# Patient Record
Sex: FEMALE | Race: WHITE | NOT HISPANIC OR LATINO | URBAN - METROPOLITAN AREA
[De-identification: names, ages, dates, MRNs, and addresses within clinical notes are randomized per-mention and may not be internally consistent; named-entity substitution may affect disease eponyms.]

---

## 2020-07-25 ENCOUNTER — TELEPHONE ENCOUNTER (OUTPATIENT)
Dept: URBAN - METROPOLITAN AREA CLINIC 13 | Facility: CLINIC | Age: 55
End: 2020-07-25

## 2020-07-25 RX ORDER — ESZOPICLONE 2 MG
TAKE 1 TABLET AT BEDTIME TABLET ORAL
Refills: 0 | OUTPATIENT
End: 2014-12-29

## 2020-07-25 RX ORDER — FUROSEMIDE 20 MG/1
TAKE 1 TABLET DAILY PRN TABLET ORAL
Refills: 0 | OUTPATIENT
End: 2014-12-29

## 2020-07-25 RX ORDER — INSULIN LISPRO 100 [IU]/ML
USE AS DIRECTED INJECTION, SOLUTION INTRAVENOUS; SUBCUTANEOUS
Refills: 0 | OUTPATIENT
Start: 2007-02-19 | End: 2008-09-03

## 2020-07-25 RX ORDER — CARISOPRODOL 250 MG/1
TAKE 1 TABLET 4 TIMES DAILY TABLET ORAL
Refills: 0 | OUTPATIENT
Start: 2008-09-03 | End: 2009-06-15

## 2020-07-25 RX ORDER — EZETIMIBE AND SIMVASTATIN 10; 80 MG/1; MG/1
TAKE 1 TABLET DAILY TABLET ORAL
Refills: 0 | OUTPATIENT
Start: 2005-08-18 | End: 2008-09-03

## 2020-07-25 RX ORDER — HUMAN INSULIN 100 [USP'U]/ML
USE AS DIRECTED INJECTION, SUSPENSION SUBCUTANEOUS
Refills: 0 | OUTPATIENT
Start: 2008-09-03 | End: 2009-06-15

## 2020-07-25 RX ORDER — INSULIN GLARGINE 100 [IU]/ML
INJECT SUBCUTANEOUSLY AS DIRECTED INJECTION, SOLUTION SUBCUTANEOUS
Refills: 0 | OUTPATIENT
Start: 2007-02-19 | End: 2008-09-03

## 2020-07-25 RX ORDER — OXYCODONE HYDROCHLORIDE AND ACETAMINOPHEN 10; 325 MG/1; MG/1
TAKE 1 TABLET EVERY 6 HOURS DAILY TABLET ORAL
Refills: 0 | OUTPATIENT
Start: 2009-07-20 | End: 2014-09-26

## 2020-07-25 RX ORDER — POLYETHYLENE GLYCOL 3350, SODIUM CHLORIDE, SODIUM BICARBONATE AND POTASSIUM CHLORIDE WITH LEMON FLAVOR 420; 11.2; 5.72; 1.48 G/4L; G/4L; G/4L; G/4L
TAKE 1/2 GALLON AT 5:00 PM DAY BEFORE PROCEDURE, TAKE SECOND 1/2 OF GALLON 6 HRS PRIOR TO PROCEDURE POWDER, FOR SOLUTION ORAL
Qty: 1 | Refills: 0 | OUTPATIENT
Start: 2014-09-26 | End: 2014-11-21

## 2020-07-26 ENCOUNTER — TELEPHONE ENCOUNTER (OUTPATIENT)
Dept: URBAN - METROPOLITAN AREA CLINIC 13 | Facility: CLINIC | Age: 55
End: 2020-07-26

## 2020-07-26 RX ORDER — INSULIN ASPART 100 [IU]/ML
INJECT SUBCUTANEOUSLY AS DIRECTED INJECTION, SOLUTION INTRAVENOUS; SUBCUTANEOUS
Refills: 0 | Status: ACTIVE | COMMUNITY
Start: 2009-06-15

## 2020-07-26 RX ORDER — DEXTROAMPHETAMINE SACCHARATE, AMPHETAMINE ASPARTATE, DEXTROAMPHETAMINE SULFATE AND AMPHETAMINE SULFATE 5; 5; 5; 5 MG/1; MG/1; MG/1; MG/1
TABLET ORAL
Qty: 120 | Refills: 0 | Status: ACTIVE | COMMUNITY
Start: 2013-06-03

## 2020-07-26 RX ORDER — OXYMORPHONE HYDROCHLORIDE 40 MG/1
TABLET, FILM COATED, EXTENDED RELEASE ORAL
Qty: 60 | Refills: 0 | Status: ACTIVE | COMMUNITY
Start: 2014-06-23

## 2020-07-26 RX ORDER — SUCRALFATE 1 G/10ML
SUSPENSION ORAL
Qty: 255 | Refills: 0 | Status: ACTIVE | COMMUNITY
Start: 2014-02-14

## 2020-07-26 RX ORDER — MONTELUKAST SODIUM 10 MG/1
TABLET, FILM COATED ORAL
Qty: 30 | Refills: 0 | Status: ACTIVE | COMMUNITY
Start: 2014-02-14

## 2020-07-26 RX ORDER — HYDROCODONE BITARTRATE AND ACETAMINOPHEN 5; 325 MG/1; MG/1
TABLET ORAL
Qty: 20 | Refills: 0 | Status: ACTIVE | COMMUNITY
Start: 2013-12-03

## 2020-07-26 RX ORDER — DEXTROAMPHETAMINE SACCHARATE, AMPHETAMINE ASPARTATE, DEXTROAMPHETAMINE SULFATE AND AMPHETAMINE SULFATE 5; 5; 5; 5 MG/1; MG/1; MG/1; MG/1
TABLET ORAL
Qty: 120 | Refills: 0 | Status: ACTIVE | COMMUNITY
Start: 2013-07-26

## 2020-07-26 RX ORDER — DEXTROAMPHETAMINE SACCHARATE, AMPHETAMINE ASPARTATE, DEXTROAMPHETAMINE SULFATE AND AMPHETAMINE SULFATE 5; 5; 5; 5 MG/1; MG/1; MG/1; MG/1
TABLET ORAL
Qty: 90 | Refills: 0 | Status: ACTIVE | COMMUNITY
Start: 2014-04-22

## 2020-07-26 RX ORDER — LINACLOTIDE 290 UG/1
CAPSULE, GELATIN COATED ORAL
Qty: 30 | Refills: 0 | Status: ACTIVE | COMMUNITY
Start: 2013-10-22

## 2020-07-26 RX ORDER — TRIAMTERENE AND HYDROCHLOROTHIAZIDE 37.5; 25 MG/1; MG/1
TABLET ORAL
Qty: 30 | Refills: 0 | Status: ACTIVE | COMMUNITY
Start: 2014-04-18

## 2020-07-26 RX ORDER — INSULIN GLARGINE 100 [IU]/ML
INJECTION, SOLUTION SUBCUTANEOUS
Qty: 15 | Refills: 0 | Status: ACTIVE | COMMUNITY
Start: 2014-02-04

## 2020-07-26 RX ORDER — AMOXICILLIN 500 MG/1
CAPSULE ORAL
Qty: 28 | Refills: 0 | Status: ACTIVE | COMMUNITY
Start: 2014-07-07

## 2020-07-26 RX ORDER — SUMATRIPTAN SUCCINATE 50 MG/1
TABLET, FILM COATED ORAL
Qty: 9 | Refills: 0 | Status: ACTIVE | COMMUNITY
Start: 2012-10-16

## 2020-07-26 RX ORDER — OXYMORPHONE HYDROCHLORIDE 40 MG/1
TABLET, FILM COATED, EXTENDED RELEASE ORAL
Qty: 60 | Refills: 0 | Status: ACTIVE | COMMUNITY
Start: 2014-03-31

## 2020-07-26 RX ORDER — DEXTROAMPHETAMINE SACCHARATE, AMPHETAMINE ASPARTATE, DEXTROAMPHETAMINE SULFATE, AND AMPHETAMINE SULFATE 5; 5; 5; 5 MG/1; MG/1; MG/1; MG/1
TAKE 2 TABLET TWICE DAILY TABLET ORAL
Refills: 0 | Status: ACTIVE | COMMUNITY

## 2020-07-26 RX ORDER — OXYMORPHONE HYDROCHLORIDE 40 MG/1
TABLET, FILM COATED, EXTENDED RELEASE ORAL
Qty: 60 | Refills: 0 | Status: ACTIVE | COMMUNITY
Start: 2013-12-10

## 2020-07-26 RX ORDER — FLUCONAZOLE 200 MG/1
TABLET ORAL
Qty: 2 | Refills: 0 | Status: ACTIVE | COMMUNITY
Start: 2013-04-30

## 2020-07-26 RX ORDER — SUCRALFATE 1 G/10ML
TAKE 10 ML 4 TIMES DAILY SUSPENSION ORAL
Refills: 0 | Status: ACTIVE | COMMUNITY

## 2020-07-26 RX ORDER — SUMATRIPTAN SUCCINATE 50 MG/1
TABLET, FILM COATED ORAL
Qty: 9 | Refills: 0 | Status: ACTIVE | COMMUNITY
Start: 2014-11-11

## 2020-07-26 RX ORDER — METRONIDAZOLE 250 MG/1
TABLET ORAL
Qty: 90 | Refills: 0 | Status: ACTIVE | COMMUNITY
Start: 2014-04-09

## 2020-07-26 RX ORDER — FLUCONAZOLE 200 MG/1
TABLET ORAL
Qty: 2 | Refills: 0 | Status: ACTIVE | COMMUNITY
Start: 2014-11-05

## 2020-07-26 RX ORDER — ZOLPIDEM TARTRATE 10 MG/1
TABLET, FILM COATED ORAL
Qty: 15 | Refills: 0 | Status: ACTIVE | COMMUNITY
Start: 2013-09-25

## 2020-07-26 RX ORDER — ALPRAZOLAM 2 MG/1
TABLET ORAL
Qty: 120 | Refills: 0 | Status: ACTIVE | COMMUNITY
Start: 2013-04-30

## 2020-07-26 RX ORDER — FLUCONAZOLE 150 MG/1
TAKE 1 TABLET DAILY AS DIRECTED TABLET ORAL
Refills: 0 | Status: ACTIVE | COMMUNITY

## 2020-07-26 RX ORDER — ALPRAZOLAM 2 MG/1
TAKE 1 TABLET 3 TIMES DAILY AS NEEDED TABLET ORAL
Refills: 0 | Status: ACTIVE | COMMUNITY

## 2020-07-26 RX ORDER — OXYMORPHONE HYDROCHLORIDE 40 MG/1
TABLET, FILM COATED, EXTENDED RELEASE ORAL
Qty: 60 | Refills: 0 | Status: ACTIVE | COMMUNITY
Start: 2013-06-03

## 2020-07-26 RX ORDER — LANCETS
EACH MISCELLANEOUS
Qty: 153 | Refills: 0 | Status: ACTIVE | COMMUNITY
Start: 2012-11-08

## 2020-07-26 RX ORDER — OXYCODONE AND ACETAMINOPHEN 5; 325 MG/1; MG/1
TABLET ORAL
Qty: 20 | Refills: 0 | Status: ACTIVE | COMMUNITY
Start: 2014-09-17

## 2020-07-26 RX ORDER — CIPROFLOXACIN HYDROCHLORIDE 500 MG/1
TABLET, FILM COATED ORAL
Qty: 14 | Refills: 0 | Status: ACTIVE | COMMUNITY
Start: 2014-04-01

## 2020-07-26 RX ORDER — INSULIN ASPART 100 [IU]/ML
INJECTION, SOLUTION INTRAVENOUS; SUBCUTANEOUS
Qty: 15 | Refills: 0 | Status: ACTIVE | COMMUNITY
Start: 2014-06-16

## 2020-07-26 RX ORDER — KETOROLAC TROMETHAMINE 15.75 MG/1
SPRAY, METERED NASAL
Qty: 5 | Refills: 0 | Status: ACTIVE | COMMUNITY
Start: 2013-07-31

## 2020-07-26 RX ORDER — DOXYCYCLINE HYCLATE 100 MG/1
TABLET ORAL
Qty: 20 | Refills: 0 | Status: ACTIVE | COMMUNITY
Start: 2013-12-27

## 2020-07-26 RX ORDER — CANAGLIFLOZIN 300 MG/1
TABLET, FILM COATED ORAL
Qty: 30 | Refills: 0 | Status: ACTIVE | COMMUNITY
Start: 2013-07-31

## 2020-07-26 RX ORDER — INSULIN GLARGINE 100 [IU]/ML
INJECTION, SOLUTION SUBCUTANEOUS
Qty: 15 | Refills: 0 | Status: ACTIVE | COMMUNITY
Start: 2013-04-30

## 2020-07-26 RX ORDER — ALPRAZOLAM 2 MG/1
TABLET ORAL
Qty: 90 | Refills: 0 | Status: ACTIVE | COMMUNITY
Start: 2014-05-22

## 2020-07-26 RX ORDER — CARISOPRODOL 350 MG/1
TABLET ORAL
Qty: 120 | Refills: 0 | Status: ACTIVE | COMMUNITY
Start: 2013-07-25

## 2020-07-26 RX ORDER — AMOXICILLIN 500 MG/1
CAPSULE ORAL
Qty: 28 | Refills: 0 | Status: ACTIVE | COMMUNITY
Start: 2014-08-20

## 2020-07-26 RX ORDER — DIAZEPAM 5 MG/1
TABLET ORAL
Qty: 1 | Refills: 0 | Status: ACTIVE | COMMUNITY
Start: 2013-09-06

## 2020-07-26 RX ORDER — SUCRALFATE 1 G/10ML
SUSPENSION ORAL
Qty: 255 | Refills: 0 | Status: ACTIVE | COMMUNITY
Start: 2013-08-18

## 2020-07-26 RX ORDER — BLOOD SUGAR DIAGNOSTIC
STRIP MISCELLANEOUS
Qty: 150 | Refills: 0 | Status: ACTIVE | COMMUNITY
Start: 2013-10-18

## 2020-07-26 RX ORDER — HYDROCODONE BITARTRATE AND HOMATROPINE METHYLBROMIDE 5; 1.5 MG/5ML; MG/5ML
SOLUTION ORAL
Qty: 120 | Refills: 0 | Status: ACTIVE | COMMUNITY
Start: 2013-12-27

## 2020-07-26 RX ORDER — MONTELUKAST SODIUM 10 MG/1
TAKE 1 TABLET DAILY TABLET, FILM COATED ORAL
Refills: 0 | Status: ACTIVE | COMMUNITY

## 2020-07-26 RX ORDER — OXYMORPHONE HYDROCHLORIDE 40 MG/1
TABLET, FILM COATED, EXTENDED RELEASE ORAL
Qty: 60 | Refills: 0 | Status: ACTIVE | COMMUNITY
Start: 2013-12-11

## 2020-07-26 RX ORDER — ONDANSETRON HYDROCHLORIDE 4 MG/1
TABLET, FILM COATED ORAL
Qty: 8 | Refills: 0 | Status: ACTIVE | COMMUNITY
Start: 2014-09-17

## 2020-07-26 RX ORDER — INSULIN GLARGINE 100 [IU]/ML
INJECT SUBCUTANEOUSLY AS DIRECTED INJECTION, SOLUTION SUBCUTANEOUS
Refills: 0 | Status: ACTIVE | COMMUNITY

## 2020-07-26 RX ORDER — GABAPENTIN 600 MG/1
TABLET, FILM COATED ORAL
Qty: 90 | Refills: 0 | Status: ACTIVE | COMMUNITY
Start: 2013-04-30

## 2020-07-26 RX ORDER — INSULIN ASPART 100 [IU]/ML
INJECTION, SOLUTION INTRAVENOUS; SUBCUTANEOUS
Qty: 15 | Refills: 0 | Status: ACTIVE | COMMUNITY
Start: 2013-04-30

## 2020-07-26 RX ORDER — LUBIPROSTONE 8 UG/1
CAPSULE, GELATIN COATED ORAL
Qty: 60 | Refills: 0 | Status: ACTIVE | COMMUNITY
Start: 2013-10-22

## 2020-07-26 RX ORDER — OXYMORPHONE HYDROCHLORIDE 40 MG/1
TABLET, FILM COATED, EXTENDED RELEASE ORAL
Qty: 60 | Refills: 0 | Status: ACTIVE | COMMUNITY
Start: 2013-07-26

## 2020-07-26 RX ORDER — NAPROXEN SODIUM 750 MG/1
TABLET, FILM COATED, EXTENDED RELEASE ORAL
Qty: 30 | Refills: 0 | Status: ACTIVE | COMMUNITY
Start: 2013-11-08

## 2020-07-26 RX ORDER — SUMATRIPTAN SUCCINATE 50 MG/1
TABLET, FILM COATED ORAL
Qty: 9 | Refills: 0 | Status: ACTIVE | COMMUNITY
Start: 2013-04-30

## 2020-07-26 RX ORDER — INSULIN GLARGINE 100 [IU]/ML
INJECTION, SOLUTION SUBCUTANEOUS
Qty: 15 | Refills: 0 | Status: ACTIVE | COMMUNITY
Start: 2014-02-14

## 2020-07-26 RX ORDER — PHENOBARBITAL, HYOSCYAMINE SULFATE, ATROPINE SULFATE, SCOPOLAMINE HYDROBROMIDE 16.2; .1037; .0194; .0065 MG/5ML; MG/5ML; MG/5ML; MG/5ML
ELIXIR ORAL
Qty: 255 | Refills: 0 | Status: ACTIVE | COMMUNITY
Start: 2013-09-30

## 2020-07-26 RX ORDER — NYSTATIN 100000 [USP'U]/ML
SUSPENSION ORAL
Qty: 140 | Refills: 0 | Status: ACTIVE | COMMUNITY
Start: 2013-08-18

## 2020-07-26 RX ORDER — METHOCARBAMOL 500 MG/1
TABLET, FILM COATED ORAL
Qty: 90 | Refills: 0 | Status: ACTIVE | COMMUNITY
Start: 2013-09-30

## 2020-07-26 RX ORDER — INSULIN ASPART 100 [IU]/ML
INJECTION, SOLUTION INTRAVENOUS; SUBCUTANEOUS
Qty: 15 | Refills: 0 | Status: ACTIVE | COMMUNITY
Start: 2014-02-14

## 2020-07-26 RX ORDER — LIDOCAINE 50 MG/G
PATCH TOPICAL
Qty: 60 | Refills: 0 | Status: ACTIVE | COMMUNITY
Start: 2014-06-25

## 2020-07-26 RX ORDER — HYDROCODONE BITARTRATE AND ACETAMINOPHEN 10; 325 MG/1; MG/1
TABLET ORAL
Qty: 120 | Refills: 0 | Status: ACTIVE | COMMUNITY
Start: 2013-04-30

## 2020-07-26 RX ORDER — PROMETHAZINE HYDROCHLORIDE 25 MG/1
SUPPOSITORY RECTAL
Qty: 20 | Refills: 0 | Status: ACTIVE | COMMUNITY
Start: 2013-12-03

## 2020-07-26 RX ORDER — CEPHALEXIN 500 MG/1
CAPSULE ORAL
Qty: 30 | Refills: 0 | Status: ACTIVE | COMMUNITY
Start: 2013-08-05

## 2020-07-26 RX ORDER — ZOLPIDEM TARTRATE 10 MG/1
TABLET, FILM COATED ORAL
Qty: 30 | Refills: 0 | Status: ACTIVE | COMMUNITY
Start: 2013-10-22

## 2020-07-26 RX ORDER — AMOXICILLIN 875 MG/1
TABLET, FILM COATED ORAL
Qty: 20 | Refills: 0 | Status: ACTIVE | COMMUNITY
Start: 2014-05-22

## 2021-12-30 ENCOUNTER — OFFICE VISIT (OUTPATIENT)
Dept: URBAN - METROPOLITAN AREA CLINIC 113 | Facility: CLINIC | Age: 56
End: 2021-12-30

## 2022-03-24 ENCOUNTER — OFFICE VISIT (OUTPATIENT)
Dept: URBAN - METROPOLITAN AREA CLINIC 107 | Facility: CLINIC | Age: 57
End: 2022-03-24

## 2022-03-24 RX ORDER — DEXTROAMPHETAMINE SACCHARATE, AMPHETAMINE ASPARTATE, DEXTROAMPHETAMINE SULFATE AND AMPHETAMINE SULFATE 5; 5; 5; 5 MG/1; MG/1; MG/1; MG/1
TABLET ORAL
Qty: 120 | Refills: 0 | Status: ACTIVE | COMMUNITY
Start: 2013-06-03

## 2022-03-24 RX ORDER — LINACLOTIDE 290 UG/1
CAPSULE, GELATIN COATED ORAL
Qty: 30 | Refills: 0 | Status: ACTIVE | COMMUNITY
Start: 2013-10-22

## 2022-03-24 RX ORDER — INSULIN ASPART 100 [IU]/ML
INJECT SUBCUTANEOUSLY AS DIRECTED INJECTION, SOLUTION INTRAVENOUS; SUBCUTANEOUS
Refills: 0 | Status: ACTIVE | COMMUNITY
Start: 2009-06-15

## 2022-03-24 RX ORDER — AMOXICILLIN 500 MG/1
CAPSULE ORAL
Qty: 28 | Refills: 0 | Status: ACTIVE | COMMUNITY
Start: 2014-07-07

## 2022-03-24 RX ORDER — ONDANSETRON HYDROCHLORIDE 4 MG/1
TABLET, FILM COATED ORAL
Qty: 8 | Refills: 0 | Status: ACTIVE | COMMUNITY
Start: 2014-09-17

## 2022-03-24 RX ORDER — HYDROCODONE BITARTRATE AND ACETAMINOPHEN 5; 325 MG/1; MG/1
TABLET ORAL
Qty: 20 | Refills: 0 | Status: ACTIVE | COMMUNITY
Start: 2013-12-03

## 2022-03-24 RX ORDER — BLOOD SUGAR DIAGNOSTIC
STRIP MISCELLANEOUS
Qty: 150 | Refills: 0 | Status: ACTIVE | COMMUNITY
Start: 2013-10-18

## 2022-03-24 RX ORDER — ALPRAZOLAM 2 MG/1
TABLET ORAL
Qty: 120 | Refills: 0 | Status: ACTIVE | COMMUNITY
Start: 2013-04-30

## 2022-03-24 RX ORDER — NAPROXEN SODIUM 750 MG/1
TABLET, FILM COATED, EXTENDED RELEASE ORAL
Qty: 30 | Refills: 0 | Status: ACTIVE | COMMUNITY
Start: 2013-11-08

## 2022-03-24 RX ORDER — CEPHALEXIN 500 MG/1
CAPSULE ORAL
Qty: 30 | Refills: 0 | Status: ACTIVE | COMMUNITY
Start: 2013-08-05

## 2022-03-24 RX ORDER — METHOCARBAMOL 500 MG/1
TABLET, FILM COATED ORAL
Qty: 90 | Refills: 0 | Status: ACTIVE | COMMUNITY
Start: 2013-09-30

## 2022-03-24 RX ORDER — DEXTROAMPHETAMINE SACCHARATE, AMPHETAMINE ASPARTATE, DEXTROAMPHETAMINE SULFATE, AND AMPHETAMINE SULFATE 5; 5; 5; 5 MG/1; MG/1; MG/1; MG/1
TAKE 2 TABLET TWICE DAILY TABLET ORAL
Refills: 0 | Status: ACTIVE | COMMUNITY

## 2022-03-24 RX ORDER — INSULIN ASPART 100 [IU]/ML
INJECTION, SOLUTION INTRAVENOUS; SUBCUTANEOUS
Qty: 15 | Refills: 0 | Status: ACTIVE | COMMUNITY
Start: 2013-04-30

## 2022-03-24 RX ORDER — LUBIPROSTONE 8 UG/1
CAPSULE, GELATIN COATED ORAL
Qty: 60 | Refills: 0 | Status: ACTIVE | COMMUNITY
Start: 2013-10-22

## 2022-03-24 RX ORDER — HYDROCODONE BITARTRATE AND ACETAMINOPHEN 10; 325 MG/1; MG/1
TABLET ORAL
Qty: 120 | Refills: 0 | Status: ACTIVE | COMMUNITY
Start: 2013-04-30

## 2022-03-24 RX ORDER — MONTELUKAST SODIUM 10 MG/1
TABLET, FILM COATED ORAL
Qty: 30 | Refills: 0 | Status: ACTIVE | COMMUNITY
Start: 2014-02-14

## 2022-03-24 RX ORDER — LANCETS
EACH MISCELLANEOUS
Qty: 153 | Refills: 0 | Status: ACTIVE | COMMUNITY
Start: 2012-11-08

## 2022-03-24 RX ORDER — CARISOPRODOL 350 MG/1
TABLET ORAL
Qty: 120 | Refills: 0 | Status: ACTIVE | COMMUNITY
Start: 2013-07-25

## 2022-03-24 RX ORDER — FLUCONAZOLE 150 MG/1
TAKE 1 TABLET DAILY AS DIRECTED TABLET ORAL
Refills: 0 | Status: ACTIVE | COMMUNITY

## 2022-03-24 RX ORDER — CANAGLIFLOZIN 300 MG/1
TABLET, FILM COATED ORAL
Qty: 30 | Refills: 0 | Status: ACTIVE | COMMUNITY
Start: 2013-07-31

## 2022-03-24 RX ORDER — HYDROCODONE BITARTRATE AND HOMATROPINE METHYLBROMIDE 5; 1.5 MG/5ML; MG/5ML
SOLUTION ORAL
Qty: 120 | Refills: 0 | Status: ACTIVE | COMMUNITY
Start: 2013-12-27

## 2022-03-24 RX ORDER — AMOXICILLIN 875 MG/1
TABLET, FILM COATED ORAL
Qty: 20 | Refills: 0 | Status: ACTIVE | COMMUNITY
Start: 2014-05-22

## 2022-03-24 RX ORDER — OXYCODONE AND ACETAMINOPHEN 5; 325 MG/1; MG/1
TABLET ORAL
Qty: 20 | Refills: 0 | Status: ACTIVE | COMMUNITY
Start: 2014-09-17

## 2022-03-24 RX ORDER — INSULIN GLARGINE 100 [IU]/ML
INJECTION, SOLUTION SUBCUTANEOUS
Qty: 15 | Refills: 0 | Status: ACTIVE | COMMUNITY
Start: 2013-04-30

## 2022-03-24 RX ORDER — KETOROLAC TROMETHAMINE 15.75 MG/1
SPRAY, METERED NASAL
Qty: 5 | Refills: 0 | Status: ACTIVE | COMMUNITY
Start: 2013-07-31

## 2022-03-24 RX ORDER — MONTELUKAST SODIUM 10 MG/1
TAKE 1 TABLET DAILY TABLET, FILM COATED ORAL
Refills: 0 | Status: ACTIVE | COMMUNITY

## 2022-03-24 RX ORDER — PHENOBARBITAL, HYOSCYAMINE SULFATE, ATROPINE SULFATE, SCOPOLAMINE HYDROBROMIDE 16.2; .1037; .0194; .0065 MG/5ML; MG/5ML; MG/5ML; MG/5ML
ELIXIR ORAL
Qty: 255 | Refills: 0 | Status: ACTIVE | COMMUNITY
Start: 2013-09-30

## 2022-03-24 RX ORDER — TRIAMTERENE AND HYDROCHLOROTHIAZIDE 37.5; 25 MG/1; MG/1
TABLET ORAL
Qty: 30 | Refills: 0 | Status: ACTIVE | COMMUNITY
Start: 2014-04-18

## 2022-03-24 RX ORDER — SUCRALFATE 1 G/10ML
TAKE 10 ML 4 TIMES DAILY SUSPENSION ORAL
Refills: 0 | Status: ACTIVE | COMMUNITY

## 2022-03-24 RX ORDER — OXYMORPHONE HYDROCHLORIDE 40 MG/1
TABLET, FILM COATED, EXTENDED RELEASE ORAL
Qty: 60 | Refills: 0 | Status: ACTIVE | COMMUNITY
Start: 2013-06-03

## 2022-03-24 RX ORDER — GABAPENTIN 600 MG/1
TABLET, FILM COATED ORAL
Qty: 90 | Refills: 0 | Status: ACTIVE | COMMUNITY
Start: 2013-04-30

## 2022-03-24 RX ORDER — SUMATRIPTAN SUCCINATE 50 MG/1
TABLET, FILM COATED ORAL
Qty: 9 | Refills: 0 | Status: ACTIVE | COMMUNITY
Start: 2012-10-16

## 2022-03-24 RX ORDER — CIPROFLOXACIN HYDROCHLORIDE 500 MG/1
TABLET, FILM COATED ORAL
Qty: 14 | Refills: 0 | Status: ACTIVE | COMMUNITY
Start: 2014-04-01

## 2022-03-24 RX ORDER — ALPRAZOLAM 2 MG/1
TAKE 1 TABLET 3 TIMES DAILY AS NEEDED TABLET ORAL
Refills: 0 | Status: ACTIVE | COMMUNITY

## 2022-03-24 RX ORDER — INSULIN GLARGINE 100 [IU]/ML
INJECT SUBCUTANEOUSLY AS DIRECTED INJECTION, SOLUTION SUBCUTANEOUS
Refills: 0 | Status: ACTIVE | COMMUNITY

## 2022-03-24 RX ORDER — FLUCONAZOLE 200 MG/1
TABLET ORAL
Qty: 2 | Refills: 0 | Status: ACTIVE | COMMUNITY
Start: 2013-04-30

## 2022-03-24 RX ORDER — NYSTATIN 100000 [USP'U]/ML
SUSPENSION ORAL
Qty: 140 | Refills: 0 | Status: ACTIVE | COMMUNITY
Start: 2013-08-18

## 2022-03-24 RX ORDER — PROMETHAZINE HYDROCHLORIDE 25 MG/1
SUPPOSITORY RECTAL
Qty: 20 | Refills: 0 | Status: ACTIVE | COMMUNITY
Start: 2013-12-03

## 2022-03-24 RX ORDER — DIAZEPAM 5 MG/1
TABLET ORAL
Qty: 1 | Refills: 0 | Status: ACTIVE | COMMUNITY
Start: 2013-09-06

## 2022-03-24 RX ORDER — DOXYCYCLINE HYCLATE 100 MG/1
TABLET ORAL
Qty: 20 | Refills: 0 | Status: ACTIVE | COMMUNITY
Start: 2013-12-27

## 2022-03-24 RX ORDER — METRONIDAZOLE 250 MG/1
TABLET ORAL
Qty: 90 | Refills: 0 | Status: ACTIVE | COMMUNITY
Start: 2014-04-09

## 2022-03-24 RX ORDER — LIDOCAINE 50 MG/G
PATCH TOPICAL
Qty: 60 | Refills: 0 | Status: ACTIVE | COMMUNITY
Start: 2014-06-25

## 2022-03-24 RX ORDER — ZOLPIDEM TARTRATE 10 MG/1
TABLET, FILM COATED ORAL
Qty: 15 | Refills: 0 | Status: ACTIVE | COMMUNITY
Start: 2013-09-25

## 2022-03-24 NOTE — HPI-TODAY'S VISIT:
Ms. Camarillo is a 57-year-old female with a history of porrly-controlled T2DM, diabetic peripheral neuropathy, and constipation predominant irritable bowel syndrome, presenting for long interval follow up. She was last seen here on 12/29/14 after radiology and colonoscopy. for follow up of a plethora of complaints.   CT a/p with contrast was performed on 10/10/14 to evaluate worsening abdominal pain. There were no acute abnormalities. Findings included diffuse fatty infiltration of the liver and mild splenomegaly.   Colonoscopy was repeated on 11/21/14 to evaluate abdominal pain and change in bowel habits. Findings included a poor prep, normal TI, A 5 mm polyp of the proximal ascending colon (fecal debris on bx), and left colon diverticulosis.   At the time of her last visit in 2014, she was in constant "severe" pain. She hurts so bad throughout her body and has stiff bones to the point that she often can not move. She is taking Oxycodone 30 mg po q4h per pain management. She reported frequent headaches and night sweats.    Her upper abdomen "still hurts like crazy." She constantly felt as though somebody was "cutting her with a knife," although she was sitting comfortably in the chair and calm while discussing those symptoms. No change in pain with defecation or food ingestion. She "stays" nauseated. She was taking Phenergan and Zofran daily.    She desxcribed bouts with diarrhea, nausea and vomiting approximately once a month during which time she usually ends up going to the hospital for management of diabetic keto-acidosis. She was admitted for this for a week and discharged on 12/23/14. Blood glucoses are 125 to 200 currently. She was now seeing Dr. Charly Avendaño for this set of complaints at that time. She was newly diagnosed with Polycythemia Vera during that admission. Hgb was 17.2, but fell to around 13 before discharge per the patient.   She is having two stools a week presently. No blood per rectum. She was taking Carafate every 4 hours for acid reflux. Weight was stable.

## 2024-06-12 ENCOUNTER — TELEPHONE ENCOUNTER (OUTPATIENT)
Dept: URBAN - METROPOLITAN AREA CLINIC 113 | Facility: CLINIC | Age: 59
End: 2024-06-12